# Patient Record
Sex: FEMALE | Race: WHITE | NOT HISPANIC OR LATINO
[De-identification: names, ages, dates, MRNs, and addresses within clinical notes are randomized per-mention and may not be internally consistent; named-entity substitution may affect disease eponyms.]

---

## 2020-01-08 PROBLEM — Z00.00 ENCOUNTER FOR PREVENTIVE HEALTH EXAMINATION: Status: ACTIVE | Noted: 2020-01-08

## 2020-01-16 ENCOUNTER — APPOINTMENT (OUTPATIENT)
Dept: OBGYN | Facility: CLINIC | Age: 55
End: 2020-01-16
Payer: COMMERCIAL

## 2020-01-16 VITALS
SYSTOLIC BLOOD PRESSURE: 124 MMHG | BODY MASS INDEX: 46.38 KG/M2 | DIASTOLIC BLOOD PRESSURE: 72 MMHG | WEIGHT: 252 LBS | HEIGHT: 62 IN

## 2020-01-16 DIAGNOSIS — Z80.3 FAMILY HISTORY OF MALIGNANT NEOPLASM OF BREAST: ICD-10-CM

## 2020-01-16 DIAGNOSIS — Z80.42 FAMILY HISTORY OF MALIGNANT NEOPLASM OF PROSTATE: ICD-10-CM

## 2020-01-16 DIAGNOSIS — Z87.42 PERSONAL HISTORY OF OTHER DISEASES OF THE FEMALE GENITAL TRACT: ICD-10-CM

## 2020-01-16 DIAGNOSIS — Z86.19 PERSONAL HISTORY OF OTHER INFECTIOUS AND PARASITIC DISEASES: ICD-10-CM

## 2020-01-16 DIAGNOSIS — Z80.52 FAMILY HISTORY OF MALIGNANT NEOPLASM OF BLADDER: ICD-10-CM

## 2020-01-16 DIAGNOSIS — N84.0 POLYP OF CORPUS UTERI: ICD-10-CM

## 2020-01-16 DIAGNOSIS — N85.00 ENDOMETRIAL HYPERPLASIA, UNSPECIFIED: ICD-10-CM

## 2020-01-16 PROCEDURE — 99396 PREV VISIT EST AGE 40-64: CPT

## 2020-07-02 ENCOUNTER — TRANSCRIPTION ENCOUNTER (OUTPATIENT)
Age: 55
End: 2020-07-02

## 2021-02-17 ENCOUNTER — APPOINTMENT (OUTPATIENT)
Dept: OBGYN | Facility: CLINIC | Age: 56
End: 2021-02-17

## 2021-09-27 ENCOUNTER — APPOINTMENT (OUTPATIENT)
Dept: OBGYN | Facility: CLINIC | Age: 56
End: 2021-09-27
Payer: OTHER GOVERNMENT

## 2021-09-27 VITALS
HEIGHT: 62 IN | WEIGHT: 263 LBS | BODY MASS INDEX: 48.4 KG/M2 | SYSTOLIC BLOOD PRESSURE: 122 MMHG | DIASTOLIC BLOOD PRESSURE: 72 MMHG

## 2021-09-27 DIAGNOSIS — Z01.419 ENCOUNTER FOR GYNECOLOGICAL EXAMINATION (GENERAL) (ROUTINE) W/OUT ABNORMAL FINDINGS: ICD-10-CM

## 2021-09-27 PROCEDURE — 99396 PREV VISIT EST AGE 40-64: CPT

## 2021-09-27 PROCEDURE — 99072 ADDL SUPL MATRL&STAF TM PHE: CPT

## 2021-09-27 NOTE — HISTORY OF PRESENT ILLNESS
[FreeTextEntry1] : Patient is 56 years old para 0-0-0-0 last menstrual period February 2012\par She denies postmenopausal bleeding and is present without complaints

## 2023-05-25 ENCOUNTER — APPOINTMENT (OUTPATIENT)
Dept: OBGYN | Facility: CLINIC | Age: 58
End: 2023-05-25
Payer: COMMERCIAL

## 2023-05-25 VITALS — SYSTOLIC BLOOD PRESSURE: 118 MMHG | WEIGHT: 249 LBS | DIASTOLIC BLOOD PRESSURE: 72 MMHG | BODY MASS INDEX: 45.54 KG/M2

## 2023-05-25 DIAGNOSIS — Z01.419 ENCOUNTER FOR GYNECOLOGICAL EXAMINATION (GENERAL) (ROUTINE) W/OUT ABNORMAL FINDINGS: ICD-10-CM

## 2023-05-25 PROCEDURE — 99396 PREV VISIT EST AGE 40-64: CPT

## 2023-05-25 NOTE — HISTORY OF PRESENT ILLNESS
[FreeTextEntry1] : Patient is 58 years old para 0-0-0-0 last menstrual period February 2012.\par She denies postmenopausal bleeding and is presently without complaints\par

## 2024-03-11 ENCOUNTER — EMERGENCY (EMERGENCY)
Facility: HOSPITAL | Age: 59
LOS: 0 days | Discharge: HOME | End: 2024-03-11
Attending: EMERGENCY MEDICINE
Payer: COMMERCIAL

## 2024-03-11 ENCOUNTER — APPOINTMENT (OUTPATIENT)
Dept: ORTHOPEDIC SURGERY | Facility: CLINIC | Age: 59
End: 2024-03-11
Payer: COMMERCIAL

## 2024-03-11 VITALS
DIASTOLIC BLOOD PRESSURE: 67 MMHG | HEART RATE: 92 BPM | TEMPERATURE: 98 F | OXYGEN SATURATION: 97 % | SYSTOLIC BLOOD PRESSURE: 165 MMHG | WEIGHT: 235.01 LBS | RESPIRATION RATE: 18 BRPM
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
## 2024-03-11 DIAGNOSIS — M25.562 PAIN IN LEFT KNEE: ICD-10-CM

## 2024-03-11 DIAGNOSIS — S83.412A SPRAIN OF MEDIAL COLLATERAL LIGAMENT OF LEFT KNEE, INITIAL ENCOUNTER: ICD-10-CM

## 2024-03-11 DIAGNOSIS — S80.02XA CONTUSION OF LEFT KNEE, INITIAL ENCOUNTER: ICD-10-CM

## 2024-03-11 DIAGNOSIS — M17.12 UNILATERAL PRIMARY OSTEOARTHRITIS, LEFT KNEE: ICD-10-CM
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
## 2024-03-11 PROCEDURE — 99203 OFFICE O/P NEW LOW 30 MIN: CPT

## 2024-03-11 PROCEDURE — 99284 EMERGENCY DEPT VISIT MOD MDM: CPT

## 2024-03-11 PROCEDURE — 99283 EMERGENCY DEPT VISIT LOW MDM: CPT | Mod: 25

## 2024-03-11 PROCEDURE — 73562 X-RAY EXAM OF KNEE 3: CPT | Mod: 26,LT

## 2024-03-11 PROCEDURE — 73562 X-RAY EXAM OF KNEE 3: CPT | Mod: LT

## 2024-03-11 RX ORDER — MELOXICAM 15 MG/1
15 TABLET ORAL
Qty: 14 | Refills: 0 | Status: ACTIVE | COMMUNITY
Start: 2024-03-11 | End: 1900-01-01

## 2024-03-11 RX ORDER — IBUPROFEN 200 MG
600 TABLET ORAL ONCE
Refills: 0 | Status: COMPLETED | OUTPATIENT
Start: 2024-03-11 | End: 2024-03-11

## 2024-03-11 RX ADMIN — Medication 600 MILLIGRAM(S): at 08:45

## 2024-03-11 NOTE — ED PROVIDER NOTE - PATIENT PORTAL LINK FT
You can access the FollowMyHealth Patient Portal offered by [facility] by registering at the following website: [email or web address]. By joining [employer]’s FollowMyHealth portal, you will also be able to view your health information using other applications (apps) compatible with our system.

## 2024-03-11 NOTE — ED PROVIDER NOTE - PHYSICAL EXAMINATION
Vital signs noted.  There is tenderness over the anterior aspect of the knee as well as over the MCL.  There is mild swelling.  No redness noted.  Extensive mid mechanism is intact.  There is mild laxity to valgus stress testing.  There is full but painful range of motion.  X-ray reviewed no fracture seen.  In my opinion, this patient has a contusion to the area and anterior aspect of the knee and may have a mild MCL sprain.  Knee immobilizer applied.  Crutches with full weightbearing ordered.

## 2024-03-11 NOTE — ED PROVIDER NOTE - CLINICAL SUMMARY MEDICAL DECISION MAKING FREE TEXT BOX
X-ray reviewed no fracture seen.  In my opinion, this patient has a contusion to the area and anterior aspect of the knee and may have a mild MCL sprain.  Knee immobilizer applied.  Crutches with full weightbearing ordered.  In my opinion, outpatient treatment and follow up are appropriate.

## 2024-03-11 NOTE — ED PROVIDER NOTE - OBJECTIVE STATEMENT
Patient complains of fall.  She hit the anterior aspect of her knee.  She is ambulatory since the fall.  She reports walking with a limp.

## 2024-03-11 NOTE — HISTORY OF PRESENT ILLNESS
[de-identified] : The patient is a 58-year-old female who is here today for evaluation of left hand pain.  She sustained a closed injury to her left knee yesterday when she was a [employer] party and then tripped on a rug and fell, landing directly on the left knee.  She was able to stand and weight-bear and ambulate after the fall.  However she has had pain in the left knee that is worse with weightbearing.  She has continued to be able to ambulate.  She rates the pain a 7 out of 10 at rest and with activity.  She describes pain all around the knee.  She has not had any treatment for this.  She has a known history of arthritis in both of her knees.  She has had a gel injection in the past that made her pain worse.  Past medical history: Hypertension, diabetes  Past surgical history: None  Allergies: None  Current medications: Metformin, losartan, hydrochlorothiazide  Family history: Diabetes, heart disease  Social history: Single Occasional alcohol use Positive tobacco use, half a pack of cigarettes for 30 years Denies recreational drug use  Review of systems: A 10-point review of systems was unremarkable as noted on the new patient questionnaire  The patient is well-appearing.  She ambulates with an antalgic gait with no assistive devices.  Examination of the left knee demonstrates intact skin.  No significant swelling.  No ecchymosis.  She is tender to palpation over the lateral joint line as well as the medial joint line.  She is tender to palpation over the patellar tendon as well as the inferior pole of the patella she is able to actively extend her knee..  No tenderness around the patella.  She lacks approximately 10 degrees of full knee extension limited by pain.  She is able to flex to at least 80 to 90 degrees limited by pain.  Negative Lachman's and negative posterior drawer.  Stable to varus and valgus stress.  Left knee x-rays taken in the office today were personally reviewed, demonstrating tricompartmental degenerative changes with joint space narrowing, severe in the medial compartment

## 2024-03-11 NOTE — ED PROVIDER NOTE - NSFOLLOWUPINSTRUCTIONS_ED_ALL_ED_FT
For pain, take 2 Tylenol and 2 Advil at the same time every 4 hours as needed.     Use knee immobilizer whenever you are walking.  Use the crutches when you are using the knee immobilizes for stability.  Follow-up with the orthopedist this week.  You can walk-in between 9 AM and 3 PM.

## 2024-03-11 NOTE — ASSESSMENT
[FreeTextEntry1] : Assessment: Left knee arthritis, acute worsening of left knee pain status post ground-level fall on 3/10/2024 No evidence of acute fracture on today's x-rays. She has been able to weight-bear and ambulate  Plan: 1.  Clinical and radiographic findings reviewed with the patient.  I reviewed today's x-rays with her. 2.  I discussed treatment options with her.  This injury occurred yesterday.  She has taken Aleve on an as-needed basis which helps.  I discussed NSAIDs with her.  I sent a prescription for short course of meloxicam to her pharmacy.  NSAID precautions were given.  I discussed that she cannot take additional over-the-counter NSAIDs while on meloxicam. 3.  I discussed that with these types of injuries, recovery can take several weeks.  she can be weightbearing as tolerated on the left lower extremity with range of motion as tolerated.  I discussed that she can use a soft compression sleeve over her knee as needed for comfort. 4.  I would like to see her back for follow-up in about 3 to 4 weeks for repeat evaluation.  No new imaging needed at that visit. 5.  Plan of care was discussed with the patient and she is in agreement.  All questions were answered satisfactorily.

## 2024-03-11 NOTE — ED PROVIDER NOTE - CARE PROVIDER_API CALL
Cliff Watters  Orthopaedic Surgery  [address] [place] [address]  Phone: [phone]  Fax: [phone]  Follow Up Time:

## 2024-04-01 ENCOUNTER — APPOINTMENT (OUTPATIENT)
Dept: ORTHOPEDIC SURGERY | Facility: CLINIC | Age: 59
End: 2024-04-01